# Patient Record
Sex: FEMALE | Race: WHITE | NOT HISPANIC OR LATINO | URBAN - METROPOLITAN AREA
[De-identification: names, ages, dates, MRNs, and addresses within clinical notes are randomized per-mention and may not be internally consistent; named-entity substitution may affect disease eponyms.]

---

## 2017-11-21 ENCOUNTER — EMERGENCY (EMERGENCY)
Facility: HOSPITAL | Age: 22
LOS: 0 days | Discharge: HOME | End: 2017-11-21
Attending: EMERGENCY MEDICINE | Admitting: EMERGENCY MEDICINE
Payer: SELF-PAY

## 2017-11-21 VITALS
RESPIRATION RATE: 17 BRPM | DIASTOLIC BLOOD PRESSURE: 72 MMHG | OXYGEN SATURATION: 100 % | TEMPERATURE: 98 F | HEART RATE: 72 BPM | SYSTOLIC BLOOD PRESSURE: 124 MMHG

## 2017-11-21 VITALS — HEIGHT: 64 IN | WEIGHT: 134.92 LBS

## 2017-11-21 DIAGNOSIS — N89.8 OTHER SPECIFIED NONINFLAMMATORY DISORDERS OF VAGINA: ICD-10-CM

## 2017-11-21 DIAGNOSIS — B37.3 CANDIDIASIS OF VULVA AND VAGINA: ICD-10-CM

## 2017-11-21 PROCEDURE — 99283 EMERGENCY DEPT VISIT LOW MDM: CPT

## 2017-11-21 RX ORDER — FLUCONAZOLE 150 MG/1
150 TABLET ORAL ONCE
Qty: 0 | Refills: 0 | Status: COMPLETED | OUTPATIENT
Start: 2017-11-21 | End: 2017-11-21

## 2017-11-21 RX ORDER — METRONIDAZOLE 500 MG
1 TABLET ORAL
Qty: 21 | Refills: 0
Start: 2017-11-21 | End: 2017-11-28

## 2017-11-21 RX ORDER — FLUCONAZOLE 150 MG/1
1 TABLET ORAL
Qty: 1 | Refills: 0
Start: 2017-11-21 | End: 2017-11-22

## 2017-11-21 RX ADMIN — FLUCONAZOLE 150 MILLIGRAM(S): 150 TABLET ORAL at 19:57

## 2017-11-21 NOTE — ED STATDOCS - OBJECTIVE STATEMENT
Pt is a 23 y/o F presenting to the ED with c/o vaginal discharge, onset 2 weeks. Pt describes the discharge as a clearish white. It has been persistent since onset. She states she was initially having vaginal itching but that has since resolved. Denies associated fever, abd pain, N/V, dysmenorrhea, and urinary sxs. Pt reports visiting the health [facility] her [facility] and had negative STI testing. She started on OTC monistat 4 days ago but it has not improved her sxs. Denies PMHx.

## 2017-11-21 NOTE — ED ADULT TRIAGE NOTE - CHIEF COMPLAINT QUOTE
vag discharge over week on yeast  infection medication not better  feels more itching  with discomfortable

## 2019-07-23 NOTE — ED ADULT TRIAGE NOTE - BMI (KG/M2)
Relevant Problems   No relevant active problems       Anesthetic History   No history of anesthetic complications            Review of Systems / Medical History  Patient summary reviewed, nursing notes reviewed and pertinent labs reviewed    Pulmonary  Within defined limits                 Neuro/Psych   Within defined limits           Cardiovascular    Hypertension              Exercise tolerance: >4 METS     GI/Hepatic/Renal     GERD: well controlled           Endo/Other        Morbid obesity and arthritis     Other Findings              Physical Exam    Airway  Mallampati: III  TM Distance: < 4 cm  Neck ROM: decreased range of motion   Mouth opening: Normal     Cardiovascular  Regular rate and rhythm,  S1 and S2 normal,  no murmur, click, rub, or gallop  Rhythm: regular  Rate: normal         Dental    Dentition: Caps/crowns     Pulmonary  Breath sounds clear to auscultation               Abdominal  GI exam deferred       Other Findings            Anesthetic Plan    ASA: 2  Anesthesia type: general          Induction: Intravenous  Anesthetic plan and risks discussed with: Patient and Family
23.2

## 2020-08-17 ENCOUNTER — EMERGENCY (EMERGENCY)
Facility: HOSPITAL | Age: 25
LOS: 0 days | Discharge: HOME | End: 2020-08-17
Payer: MEDICARE

## 2020-08-17 VITALS
SYSTOLIC BLOOD PRESSURE: 120 MMHG | RESPIRATION RATE: 16 BRPM | HEART RATE: 76 BPM | TEMPERATURE: 98 F | OXYGEN SATURATION: 97 % | DIASTOLIC BLOOD PRESSURE: 80 MMHG

## 2020-08-17 DIAGNOSIS — Z20.828 CONTACT WITH AND (SUSPECTED) EXPOSURE TO OTHER VIRAL COMMUNICABLE DISEASES: ICD-10-CM

## 2020-08-17 DIAGNOSIS — Z11.59 ENCOUNTER FOR SCREENING FOR OTHER VIRAL DISEASES: ICD-10-CM

## 2020-08-17 PROCEDURE — U0003: CPT

## 2020-08-17 PROCEDURE — 99283 EMERGENCY DEPT VISIT LOW MDM: CPT

## 2020-08-17 NOTE — ED STATDOCS - NS ED ROS FT
ROS: Constitutional- -fever, -chills, -body aches.  Respiratory- -cough, -SOB  Cardiac- no chest pain, no palpitations, ENT- -rhinorrhea, no sore throat, no congestion.  Abdomen- No nausea, no vomiting, no diarrhea.  Urinary- no dysuria, no urgency, no frequency.  Skin- No rashes. Neuro-  -HA

## 2020-08-17 NOTE — ED ADULT TRIAGE NOTE - CHIEF COMPLAINT QUOTE
pt presents to ED for covid swab, pt denies symptoms, pt seen and evaluated by PA and gave verbal consent to receive results via text

## 2020-08-17 NOTE — ED STATDOCS - PATIENT PORTAL LINK FT
You can access the FollowMyHealth Patient Portal offered by [facility] by registering at the following website: [email or web address]. By joining [employer]’s FollowMyHealth portal, you will also be able to view your health information using other applications (apps) compatible with our system.

## 2020-08-17 NOTE — ED STATDOCS - NSFOLLOWUPINSTRUCTIONS_ED_ALL_ED_FT
Pt here for COVID-19 testing. Pt non toxic. Pt was swabbed for COVID-19 in their car in drive through area. [facility] will call pt with results. return precautions given. Home self-quarantine recommended. Pt agrees with plan of  care.

## 2020-08-18 LAB — SARS-COV-2 RNA SPEC QL NAA+PROBE: SIGNIFICANT CHANGE UP

## 2021-10-28 ENCOUNTER — OUTPATIENT (OUTPATIENT)
Dept: OUTPATIENT SERVICES | Facility: HOSPITAL | Age: 26
LOS: 1 days | End: 2021-10-28

## 2021-10-28 VITALS
DIASTOLIC BLOOD PRESSURE: 82 MMHG | OXYGEN SATURATION: 98 % | HEIGHT: 64.5 IN | RESPIRATION RATE: 16 BRPM | TEMPERATURE: 98 F | SYSTOLIC BLOOD PRESSURE: 121 MMHG | HEART RATE: 68 BPM | WEIGHT: 136.91 LBS

## 2021-10-28 DIAGNOSIS — Z90.89 ACQUIRED ABSENCE OF OTHER ORGANS: Chronic | ICD-10-CM

## 2021-10-28 DIAGNOSIS — Z01.812 ENCOUNTER FOR PREPROCEDURAL LABORATORY EXAMINATION: ICD-10-CM

## 2021-10-28 DIAGNOSIS — M26.04 MANDIBULAR HYPOPLASIA: ICD-10-CM

## 2021-10-28 DIAGNOSIS — C71.9 MALIGNANT NEOPLASM OF BRAIN, UNSPECIFIED: Chronic | ICD-10-CM

## 2021-10-28 DIAGNOSIS — M26.02 MAXILLARY HYPOPLASIA: ICD-10-CM

## 2021-10-28 LAB
BLD GP AB SCN SERPL QL: NEGATIVE — SIGNIFICANT CHANGE UP
HCG SERPL-ACNC: <5 MIU/ML — SIGNIFICANT CHANGE UP
HCT VFR BLD CALC: 41.6 % — SIGNIFICANT CHANGE UP (ref 34.5–45)
HGB BLD-MCNC: 13.8 G/DL — SIGNIFICANT CHANGE UP (ref 11.5–15.5)
MCHC RBC-ENTMCNC: 32.7 PG — SIGNIFICANT CHANGE UP (ref 27–34)
MCHC RBC-ENTMCNC: 33.2 GM/DL — SIGNIFICANT CHANGE UP (ref 32–36)
MCV RBC AUTO: 98.6 FL — SIGNIFICANT CHANGE UP (ref 80–100)
NRBC # BLD: 0 /100 WBCS — SIGNIFICANT CHANGE UP
NRBC # FLD: 0 K/UL — SIGNIFICANT CHANGE UP
PLATELET # BLD AUTO: 228 K/UL — SIGNIFICANT CHANGE UP (ref 150–400)
RBC # BLD: 4.22 M/UL — SIGNIFICANT CHANGE UP (ref 3.8–5.2)
RBC # FLD: 10.9 % — SIGNIFICANT CHANGE UP (ref 10.3–14.5)
RH IG SCN BLD-IMP: POSITIVE — SIGNIFICANT CHANGE UP
WBC # BLD: 4.39 K/UL — SIGNIFICANT CHANGE UP (ref 3.8–10.5)
WBC # FLD AUTO: 4.39 K/UL — SIGNIFICANT CHANGE UP (ref 3.8–10.5)

## 2021-10-28 NOTE — H&P PST ADULT - HISTORY OF PRESENT ILLNESS
27Y/o female presents for preop eval for scheduled Maxillary lefort I osteotomy, b/l sagittal split osteotomies genioplasty.  Pt with c/o cross & over bite unable to correct with dental braces and TMJ.

## 2021-10-28 NOTE — H&P PST ADULT - CENTRAL VENOUS CATHETER
[Patient Intake Form Reviewed] : Patient intake form was reviewed [FreeTextEntry3] : scleral yellowing [Negative] : Endocrine [FreeTextEntry5] : shortness of breath/fatigue with exercise [FreeTextEntry1] : anemia no

## 2021-10-28 NOTE — H&P PST ADULT - PROBLEM SELECTOR PLAN 1
scheduled Maxillary lefort I osteotomy, b/l sagittal split osteotomies genioplasty on 11/9/2021.  Written & verbal preop instructions, gi prophylaxis   Pt verbalized good understanding.

## 2021-10-28 NOTE — H&P PST ADULT - NSICDXFAMILYHX_GEN_ALL_CORE_FT
FAMILY HISTORY:  Mother  Still living? Yes, Estimated age: Age Unknown  FH: heart attack, Age at diagnosis: Age Unknown  FH: type 1 diabetes, Age at diagnosis: Age Unknown    Uncle  Still living? Yes, Estimated age: Age Unknown  Family history of brain cancer, Age at diagnosis: Age Unknown

## 2021-10-28 NOTE — H&P PST ADULT - NSICDXPASTMEDICALHX_GEN_ALL_CORE_FT
PAST MEDICAL HISTORY:  Anxiety and depression h/o    Mandibular hypoplasia     Maxillary hypoplasia     No pertinent past medical history     TMJ (temporomandibular joint disorder)

## 2021-11-02 PROBLEM — M26.609 UNSPECIFIED TEMPOROMANDIBULAR JOINT DISORDER, UNSPECIFIED SIDE: Chronic | Status: ACTIVE | Noted: 2021-10-28

## 2021-11-02 PROBLEM — M26.04 MANDIBULAR HYPOPLASIA: Chronic | Status: ACTIVE | Noted: 2021-10-28

## 2021-11-02 PROBLEM — F41.9 ANXIETY DISORDER, UNSPECIFIED: Chronic | Status: ACTIVE | Noted: 2021-10-28

## 2021-11-02 PROBLEM — M26.02 MAXILLARY HYPOPLASIA: Chronic | Status: ACTIVE | Noted: 2021-10-28

## 2021-11-06 ENCOUNTER — APPOINTMENT (OUTPATIENT)
Dept: DISASTER EMERGENCY | Facility: CLINIC | Age: 26
End: 2021-11-06

## 2021-11-06 DIAGNOSIS — Z01.818 ENCOUNTER FOR OTHER PREPROCEDURAL EXAMINATION: ICD-10-CM

## 2021-11-07 LAB — SARS-COV-2 N GENE NPH QL NAA+PROBE: NOT DETECTED

## 2021-11-08 ENCOUNTER — TRANSCRIPTION ENCOUNTER (OUTPATIENT)
Age: 26
End: 2021-11-08

## 2021-11-09 ENCOUNTER — INPATIENT (INPATIENT)
Facility: HOSPITAL | Age: 26
LOS: 0 days | Discharge: HOME | End: 2021-11-10
Attending: ORAL & MAXILLOFACIAL SURGERY | Admitting: ORAL & MAXILLOFACIAL SURGERY

## 2021-11-09 VITALS
WEIGHT: 136.91 LBS | OXYGEN SATURATION: 100 % | DIASTOLIC BLOOD PRESSURE: 68 MMHG | RESPIRATION RATE: 16 BRPM | HEIGHT: 64.5 IN | HEART RATE: 76 BPM | SYSTOLIC BLOOD PRESSURE: 110 MMHG | TEMPERATURE: 98 F

## 2021-11-09 DIAGNOSIS — Z90.89 ACQUIRED ABSENCE OF OTHER ORGANS: Chronic | ICD-10-CM

## 2021-11-09 DIAGNOSIS — M26.04 MANDIBULAR HYPOPLASIA: ICD-10-CM

## 2021-11-09 LAB — HCG UR QL: NEGATIVE — SIGNIFICANT CHANGE UP

## 2021-11-09 RX ORDER — ACETAMINOPHEN 500 MG
650 TABLET ORAL EVERY 6 HOURS
Refills: 0 | Status: DISCONTINUED | OUTPATIENT
Start: 2021-11-09 | End: 2021-11-10

## 2021-11-09 RX ORDER — FLUTICASONE PROPIONATE 50 MCG
1 SPRAY, SUSPENSION NASAL
Refills: 0 | Status: DISCONTINUED | OUTPATIENT
Start: 2021-11-09 | End: 2021-11-10

## 2021-11-09 RX ORDER — IBUPROFEN 200 MG
600 TABLET ORAL EVERY 6 HOURS
Refills: 0 | Status: DISCONTINUED | OUTPATIENT
Start: 2021-11-09 | End: 2021-11-10

## 2021-11-09 RX ORDER — PENICILLIN G POTASSIUM 5000000 [IU]/1
2 POWDER, FOR SOLUTION INTRAMUSCULAR; INTRAPLEURAL; INTRATHECAL; INTRAVENOUS EVERY 4 HOURS
Refills: 0 | Status: DISCONTINUED | OUTPATIENT
Start: 2021-11-09 | End: 2021-11-10

## 2021-11-09 RX ORDER — SODIUM CHLORIDE 9 MG/ML
500 INJECTION, SOLUTION INTRAVENOUS ONCE
Refills: 0 | Status: COMPLETED | OUTPATIENT
Start: 2021-11-09 | End: 2021-11-09

## 2021-11-09 RX ORDER — OXYMETAZOLINE HYDROCHLORIDE 0.5 MG/ML
1 SPRAY NASAL
Refills: 0 | Status: DISCONTINUED | OUTPATIENT
Start: 2021-11-09 | End: 2021-11-10

## 2021-11-09 RX ORDER — CHLORHEXIDINE GLUCONATE 213 G/1000ML
15 SOLUTION TOPICAL
Refills: 0 | Status: DISCONTINUED | OUTPATIENT
Start: 2021-11-09 | End: 2021-11-10

## 2021-11-09 RX ORDER — SODIUM CHLORIDE 9 MG/ML
500 INJECTION, SOLUTION INTRAVENOUS
Refills: 0 | Status: DISCONTINUED | OUTPATIENT
Start: 2021-11-09 | End: 2021-11-10

## 2021-11-09 RX ORDER — FENTANYL CITRATE 50 UG/ML
50 INJECTION INTRAVENOUS
Refills: 0 | Status: DISCONTINUED | OUTPATIENT
Start: 2021-11-09 | End: 2021-11-09

## 2021-11-09 RX ORDER — SODIUM CHLORIDE 0.65 %
1 AEROSOL, SPRAY (ML) NASAL
Refills: 0 | Status: DISCONTINUED | OUTPATIENT
Start: 2021-11-09 | End: 2021-11-10

## 2021-11-09 RX ORDER — METOCLOPRAMIDE HCL 10 MG
10 TABLET ORAL DAILY
Refills: 0 | Status: DISCONTINUED | OUTPATIENT
Start: 2021-11-09 | End: 2021-11-10

## 2021-11-09 RX ORDER — ONDANSETRON 8 MG/1
4 TABLET, FILM COATED ORAL ONCE
Refills: 0 | Status: DISCONTINUED | OUTPATIENT
Start: 2021-11-09 | End: 2021-11-09

## 2021-11-09 RX ORDER — FENTANYL CITRATE 50 UG/ML
25 INJECTION INTRAVENOUS
Refills: 0 | Status: DISCONTINUED | OUTPATIENT
Start: 2021-11-09 | End: 2021-11-09

## 2021-11-09 RX ORDER — MORPHINE SULFATE 50 MG/1
2 CAPSULE, EXTENDED RELEASE ORAL EVERY 4 HOURS
Refills: 0 | Status: DISCONTINUED | OUTPATIENT
Start: 2021-11-09 | End: 2021-11-10

## 2021-11-09 RX ORDER — OXYCODONE HYDROCHLORIDE 5 MG/1
5 TABLET ORAL EVERY 4 HOURS
Refills: 0 | Status: DISCONTINUED | OUTPATIENT
Start: 2021-11-09 | End: 2021-11-10

## 2021-11-09 RX ORDER — ONDANSETRON 8 MG/1
4 TABLET, FILM COATED ORAL EVERY 8 HOURS
Refills: 0 | Status: DISCONTINUED | OUTPATIENT
Start: 2021-11-09 | End: 2021-11-10

## 2021-11-09 RX ADMIN — Medication 600 MILLIGRAM(S): at 19:09

## 2021-11-09 RX ADMIN — Medication 1 SPRAY(S): at 19:08

## 2021-11-09 RX ADMIN — CHLORHEXIDINE GLUCONATE 15 MILLILITER(S): 213 SOLUTION TOPICAL at 17:35

## 2021-11-09 RX ADMIN — PENICILLIN G POTASSIUM 100 MILLION UNIT(S): 5000000 POWDER, FOR SOLUTION INTRAMUSCULAR; INTRAPLEURAL; INTRATHECAL; INTRAVENOUS at 13:03

## 2021-11-09 RX ADMIN — Medication 650 MILLIGRAM(S): at 23:01

## 2021-11-09 RX ADMIN — Medication 650 MILLIGRAM(S): at 18:05

## 2021-11-09 RX ADMIN — FENTANYL CITRATE 50 MICROGRAM(S): 50 INJECTION INTRAVENOUS at 11:42

## 2021-11-09 RX ADMIN — Medication 650 MILLIGRAM(S): at 17:35

## 2021-11-09 RX ADMIN — FENTANYL CITRATE 50 MICROGRAM(S): 50 INJECTION INTRAVENOUS at 12:00

## 2021-11-09 RX ADMIN — Medication 600 MILLIGRAM(S): at 23:52

## 2021-11-09 RX ADMIN — OXYCODONE HYDROCHLORIDE 5 MILLIGRAM(S): 5 TABLET ORAL at 20:26

## 2021-11-09 RX ADMIN — SODIUM CHLORIDE 1000 MILLILITER(S): 9 INJECTION, SOLUTION INTRAVENOUS at 13:01

## 2021-11-09 RX ADMIN — Medication 600 MILLIGRAM(S): at 12:18

## 2021-11-09 RX ADMIN — SODIUM CHLORIDE 85 MILLILITER(S): 9 INJECTION, SOLUTION INTRAVENOUS at 11:47

## 2021-11-09 RX ADMIN — Medication 600 MILLIGRAM(S): at 19:39

## 2021-11-09 RX ADMIN — OXYCODONE HYDROCHLORIDE 5 MILLIGRAM(S): 5 TABLET ORAL at 19:56

## 2021-11-09 RX ADMIN — Medication 650 MILLIGRAM(S): at 23:34

## 2021-11-09 RX ADMIN — Medication 600 MILLIGRAM(S): at 12:30

## 2021-11-09 RX ADMIN — PENICILLIN G POTASSIUM 100 MILLION UNIT(S): 5000000 POWDER, FOR SOLUTION INTRAMUSCULAR; INTRAPLEURAL; INTRATHECAL; INTRAVENOUS at 19:07

## 2021-11-09 RX ADMIN — PENICILLIN G POTASSIUM 100 MILLION UNIT(S): 5000000 POWDER, FOR SOLUTION INTRAMUSCULAR; INTRAPLEURAL; INTRATHECAL; INTRAVENOUS at 21:38

## 2021-11-09 RX ADMIN — OXYMETAZOLINE HYDROCHLORIDE 1 SPRAY(S): 0.5 SPRAY NASAL at 12:46

## 2021-11-09 NOTE — H&P ADULT - NSHPREVIEWOFSYSTEMS_GEN_ALL_CORE
· Negative General Symptoms	no fever; no chills; no sweating  · Negative Skin Symptoms	no rash; no itching; no dryness  · Negative Breast Symptoms	no breast tenderness L; no breast tenderness R; no breast lump L; no breast lump R; no nipple discharge L; no nipple discharge R  · Negative Ophthalmologic Symptoms	no diplopia; no photophobia; no blurred vision L; no blurred vision R; glasses  · Negative ENMT Symptoms	no hearing difficulty; no tinnitus; no vertigo; no sinus symptoms; no throat pain; no dysphagia  · ENMT Symptoms	vertigo  chronic fullness left ear  · ENMT Comments	TMJ, over & cross bite  · Negative Respiratory and Thorax Symptoms	no wheezing; no dyspnea; no cough; no hemoptysis; no pleuritic chest pain  · Negative Cardiovascular Symptoms	no chest pain; no palpitations; no dyspnea on exertion; no orthopnea; no paroxysmal nocturnal dyspnea; no peripheral edema; no claudication  · Negative Gastrointestinal Symptoms	no nausea; no vomiting; no diarrhea; no constipation; no abdominal pain  · Negative General Genitourinary Symptoms	no hematuria; no renal colic; no flank pain L; no flank pain R; no bladder infections  · Female-Specific Symptoms	irregular menses  · Negative Musculoskeletal Symptoms	no arthritis; no joint swelling  · Negative Neurological Symptoms	no weakness; no paresthesias; no generalized seizures  · Psychiatric Symptoms	depression; anxiety; h/o no longer followed by psychiatrist, no medications  · Hematology/Lymphatics	negative  · Endocrine	negative  · Allergic/Immunologic	negative

## 2021-11-09 NOTE — H&P ADULT - NSHPPHYSICALEXAM_GEN_ALL_CORE
· Constitutional	no distress  · Eyes	PERRL; EOMI; conjunctiva clear; pupil L; pupil R  · Pupil L	round; brisk  · Pupil Size L	mm, 3  · Pupil Size R	mm, 3  · ENMT	Pharynx	no redness; no discharge  · Tonsils	no redness  · Neck	supple; no JVD  · Breasts	not examined  · Back	normal shape; ROM intact  · Respiratory	breath sounds equal; respirations non-labored; clear to auscultation bilaterally  · Cardiovascular	regular rate and rhythm  normal PMI. positive S1; positive S2  · Gastrointestinal	soft; nontender; no distention; no masses palpable; bowel sounds normal  · Genitourinary	not examined  · Rectal	not examined  · Extremities	no clubbing; no cyanosis; no pedal edema  · Vascular	  · Carotid Pulse	right normal; left normal  · Radial Pulse	right normal; left normal  · Neurological	alert and oriented x 3  · Skin	warm and dry; color normal  · Lymph Nodes	posterior cervical L; posterior cervical R; anterior cervical L; anterior cervical R; supraclavicular L; supraclavicular R  · Posterior Cervical L	normal  · Posterior Cervical R	normal  · Anterior Cervical L	normal  · Anterior Cervical R	normal  · Supraclavicular L	normal  · Supraclavicular R	normal  · Musculoskeletal	ROM intact; no joint swelling; no joint erythema; + clicking of TMJ  · Psychiatric	normal affect; normal behavior

## 2021-11-09 NOTE — H&P ADULT - HISTORY OF PRESENT ILLNESS
PATIENT REFUSES TO WEAR BIPAP     [x] Risks and benefits explained to patient   [x] Patient refuses to wear Bipap stating \"I am breathing just fine. \"  [x] Patient verbalizes understanding of information presented. 27Y/o female presents for scheduled Maxillary lefort I osteotomy, b/l sagittal split osteotomies, genioplasty.  Pt with c/o cross & over bite unable to correct with dental braces and TMJ.

## 2021-11-09 NOTE — H&P ADULT - ASSESSMENT
25Y/o female presents for scheduled Maxillary lefort I osteotomy, b/l sagittal split osteotomies, genioplasty.  Pt with c/o cross & over bite unable to correct with dental braces and TMJ.

## 2021-11-10 ENCOUNTER — TRANSCRIPTION ENCOUNTER (OUTPATIENT)
Age: 26
End: 2021-11-10

## 2021-11-10 VITALS
HEART RATE: 87 BPM | DIASTOLIC BLOOD PRESSURE: 60 MMHG | OXYGEN SATURATION: 100 % | TEMPERATURE: 97 F | SYSTOLIC BLOOD PRESSURE: 102 MMHG | RESPIRATION RATE: 18 BRPM

## 2021-11-10 RX ORDER — IBUPROFEN 200 MG
30 TABLET ORAL
Qty: 0 | Refills: 0 | DISCHARGE
Start: 2021-11-10

## 2021-11-10 RX ORDER — OXYCODONE HYDROCHLORIDE 5 MG/1
5 TABLET ORAL
Qty: 0 | Refills: 0 | DISCHARGE
Start: 2021-11-10

## 2021-11-10 RX ORDER — CHLORHEXIDINE GLUCONATE 213 G/1000ML
0 SOLUTION TOPICAL
Qty: 0 | Refills: 0 | DISCHARGE
Start: 2021-11-10

## 2021-11-10 RX ORDER — SODIUM CHLORIDE 0.65 %
0 AEROSOL, SPRAY (ML) NASAL
Qty: 0 | Refills: 0 | DISCHARGE
Start: 2021-11-10

## 2021-11-10 RX ORDER — ACETAMINOPHEN 500 MG
20.31 TABLET ORAL
Qty: 0 | Refills: 0 | DISCHARGE
Start: 2021-11-10

## 2021-11-10 RX ORDER — FLUTICASONE PROPIONATE 50 MCG
1 SPRAY, SUSPENSION NASAL
Qty: 0 | Refills: 0 | DISCHARGE
Start: 2021-11-10

## 2021-11-10 RX ORDER — AMOXICILLIN 250 MG/5ML
10 SUSPENSION, RECONSTITUTED, ORAL (ML) ORAL
Qty: 0 | Refills: 0 | DISCHARGE

## 2021-11-10 RX ORDER — OXYMETAZOLINE HYDROCHLORIDE 0.5 MG/ML
0 SPRAY NASAL
Qty: 0 | Refills: 0 | DISCHARGE
Start: 2021-11-10 | End: 2021-11-12

## 2021-11-10 RX ADMIN — Medication 650 MILLIGRAM(S): at 06:00

## 2021-11-10 RX ADMIN — OXYCODONE HYDROCHLORIDE 5 MILLIGRAM(S): 5 TABLET ORAL at 02:57

## 2021-11-10 RX ADMIN — Medication 600 MILLIGRAM(S): at 00:22

## 2021-11-10 RX ADMIN — Medication 600 MILLIGRAM(S): at 06:00

## 2021-11-10 RX ADMIN — PENICILLIN G POTASSIUM 100 MILLION UNIT(S): 5000000 POWDER, FOR SOLUTION INTRAMUSCULAR; INTRAPLEURAL; INTRATHECAL; INTRAVENOUS at 05:33

## 2021-11-10 RX ADMIN — CHLORHEXIDINE GLUCONATE 15 MILLILITER(S): 213 SOLUTION TOPICAL at 05:33

## 2021-11-10 RX ADMIN — Medication 600 MILLIGRAM(S): at 05:33

## 2021-11-10 RX ADMIN — Medication 650 MILLIGRAM(S): at 05:33

## 2021-11-10 RX ADMIN — PENICILLIN G POTASSIUM 100 MILLION UNIT(S): 5000000 POWDER, FOR SOLUTION INTRAMUSCULAR; INTRAPLEURAL; INTRATHECAL; INTRAVENOUS at 01:13

## 2021-11-10 RX ADMIN — Medication 1 SPRAY(S): at 05:33

## 2021-11-10 RX ADMIN — OXYMETAZOLINE HYDROCHLORIDE 1 SPRAY(S): 0.5 SPRAY NASAL at 05:34

## 2021-11-10 NOTE — PROGRESS NOTE ADULT - ASSESSMENT
A/P: 26y Female  s/p BSSO. POD#1     - Encourage PO intake--diet advanced to FLD   - Encourage ambulation   - Cont Abx  - Pain control  - sinus precautions   - Monitor i/o's, Vitals q4h  - d/c planning today

## 2021-11-10 NOTE — DISCHARGE NOTE PROVIDER - NSDCCPTREATMENT_GEN_ALL_CORE_FT
PRINCIPAL PROCEDURE  Procedure: Sagittal split mandibular osteotomy by intraoral approach  Findings and Treatment:

## 2021-11-10 NOTE — DISCHARGE NOTE NURSING/CASE MANAGEMENT/SOCIAL WORK - PATIENT PORTAL LINK FT
You can access the FollowMyHealth Patient Portal offered by [facility] by registering at the following website: [email or web address]. By joining [employer]’s FollowMyHealth portal, you will also be able to view your health information using other applications (apps) compatible with our system.

## 2021-11-10 NOTE — DISCHARGE NOTE PROVIDER - NSDCMRMEDTOKEN_GEN_ALL_CORE_FT
acetaminophen 160 mg/5 mL oral suspension: 20.31 milliliter(s) orally every 6 hours  amoxicillin 250 mg/5 mL oral suspension: 10 milliliter(s) orally 3 times a day  chlorhexidine 0.12% mucous membrane liquid:  mucous membrane   fluticasone 50 mcg/inh nasal spray: 1 spray(s) nasal 2 times a day  ibuprofen 100 mg/5 mL oral suspension: 30 milliliter(s) orally every 6 hours  oxyCODONE 5 mg/5 mL oral solution: 5 milliliter(s) orally every 4 hours, As needed, Moderate Pain (4 - 6)  oxymetazoline 0.05% nasal spray:  nasal   sodium chloride 0.65% nasal spray:  nasal

## 2021-11-10 NOTE — DISCHARGE NOTE PROVIDER - CARE PROVIDER_API CALL
Edwar Bacon (DDS)  OralMaxillofacial Surgery  [address]  [place], [place] [id number]  Phone: [phone]  Fax: [phone]  Follow Up Time:

## 2021-11-10 NOTE — DISCHARGE NOTE NURSING/CASE MANAGEMENT/SOCIAL WORK - NSDCPNINST_GEN_ALL_CORE
Watch for signs of infection; redness, swelling, fever, chills or heat, report such symptoms to the MD. No driving while taking pain medication, it causes drowsiness & constipation. Drink 6-8 glasses of fluids daily to promote hydration. No heavy lifting, pulling or pushing heavy objects. Follow up with the MD as per discharge orders

## 2021-11-10 NOTE — PROGRESS NOTE ADULT - SUBJECTIVE AND OBJECTIVE BOX
OMFS PROGRESS NOTE  #[id number]     26y Female s/p BSSO. pod#1     INTERVAL HX/SUBJECTIVE :   -NAEON      -Pain well controlled. No nausea or vomitting.   -Ambulating, voiding, tolerating PO.    Objective:  Gen: NAD, AAOx3  Pulm: No work on breathing  Card: RRR  EOE: B/L lower facial edema c/w surgery. Jaw bra in place, Ice in place   IOE: Sutures clean and intact. Incisions hemostatic. Gingiva pink, and well-perfused. Elastics in place. Occlusion stable into splint. Midlines coincide     Vital Signs Last 24 Hrs  T(C): 36.7 (10 Nov 2021 05:15), Max: 37 (09 Nov 2021 11:20)  T(F): 98 (10 Nov 2021 05:15), Max: 98.6 (09 Nov 2021 11:20)  HR: 72 (10 Nov 2021 05:15) (72 - 111)  BP: 112/65 (10 Nov 2021 05:15) (104/60 - 130/74)  BP(mean): 85 (09 Nov 2021 14:30) (77 - 103)  RR: 16 (10 Nov 2021 05:15) (13 - 20)  SpO2: 98% (10 Nov 2021 05:15) (92% - 100%)  I&O's Summary    09 Nov 2021 07:01  -  10 Nov 2021 05:58  --------------------------------------------------------  IN: 1860 mL / OUT: 1770 mL / NET: 90 mL      I&O's Detail    09 Nov 2021 07:01  -  10 Nov 2021 05:58  --------------------------------------------------------  IN:    IV PiggyBack: 200 mL    Lactated Ringers: 1020 mL    Oral Fluid: 640 mL  Total IN: 1860 mL    OUT:    Nasogastric/Oral tube (mL): 70 mL    Voided (mL): 1700 mL  Total OUT: 1770 mL    Total NET: 90 mL          MEDICATIONS  (STANDING):  acetaminophen    Suspension .. 650 milliGRAM(s) Oral every 6 hours  chlorhexidine 0.12% Liquid 15 milliLiter(s) Swish and Spit two times a day  fluticasone propionate 50 MICROgram(s)/spray Nasal Spray 1 Spray(s) Both Nostrils two times a day  ibuprofen  Suspension. 600 milliGRAM(s) Oral every 6 hours  oxymetazoline 0.05% Nasal Spray 1 Spray(s) Both Nostrils two times a day  penicillin   G  potassium  IVPB 2 Million Unit(s) IV Intermittent every 4 hours    MEDICATIONS  (PRN):  metoclopramide Injectable 10 milliGRAM(s) IV Push daily PRN nauseau/vomitting  morphine  - Injectable 2 milliGRAM(s) IV Push every 4 hours PRN Severe Pain (7 - 10)  ondansetron    Tablet 4 milliGRAM(s) Oral every 8 hours PRN Nausea and/or Vomiting  oxyCODONE    Solution 5 milliGRAM(s) Oral every 4 hours PRN Moderate Pain (4 - 6)  sodium chloride 0.65% Nasal 1 Spray(s) Both Nostrils every 2 hours PRN Nasal Congestion

## 2021-11-10 NOTE — DISCHARGE NOTE PROVIDER - HOSPITAL COURSE
11/9- Pt underwent BSSO. No acute events since OR. Transferred to floor, resting comfortably in bed. Pt ambulating, voiding, tolerating PO intake.   11/10: NAEON. Pt recovering uneventfully. Hemodynamically stable, optimized for d/c

## 2024-01-31 NOTE — H&P PST ADULT - SUPRACLAVICULAR R
-Check A1c. Last CMP within normal.   -A1c goal is 5.6% or less.  -Follow up with dietitian.  -Keep 3 day food log to review with dietitian.  -Start self monitoring blood glucose (SMBG) fasting and 2 hours after start of each meal.   -Glucose goals: fasting 60-90 mg/dL, 140 mg/dL or less 1 hour post meals, and 120 mg/dL or less 2 hours post meal.   -Report glucose readings weekly via [employer] every Wednesday.   -Start GDM 2000 calorie meal plan with 3 meals and 3 snacks including recommended combination of carb, protein and fat per meal/snack.  -Please eat meal or snack every 2-3.5 hours while awake.  -No more than 8 to 10 hours of fasting overnight.  -Refer to [employer] MyPlate online.   -Minimum total daily carbohydrates 175 grams paired with half grams in protein.   -Stay active if no restriction from your OB, walk up to 30 minutes a day.  -Always have glucose available to treat hypoglycemia. Use 15:15 rule. Refer to hypoglycemia patient education sheet. SMBG when experiencing signs and symptoms of hypoglycemia and prior to driving.   -Fetal growth ultrasound every 4 to 6 weeks gestation.  -20 week detailed fetal growth ultrasound.  -22-24 weeks fetal echo.  -If diabetes related medications are started; st 32 weeks gestation, NST twice a week and SAKINA weekly.   -Continue prenatal vitamin and baby aspirin as recommended.  -At 36 weeks gestation, discontinue baby aspirin.   -Continue follow-up with your OB and MFM as recommended.  -Stay in close contact with diabetes education team.  -Insulin requirements during pregnancy; basal/bolus concept and Metformin discussed.  -Very important to maintain tight glucose control during pregnancy to decrease risk factors including fetal macrosomia; birth injury; risk of ; polyhydramnios; pre-term labor; pre-eclampsia;  hypoglycemia; jaundice and stillbirth.   -Diabetes and pregnancy booklet; meal plan and hypoglycemia patient education.     normal

## 2025-03-02 ENCOUNTER — NON-APPOINTMENT (OUTPATIENT)
Age: 30
End: 2025-03-02

## 2025-06-19 NOTE — H&P PST ADULT - GENERAL
Chest pain or pressure, or a strange feeling in the chest.     Sweating.     Shortness of breath.     Pain, pressure, or a strange feeling in the back, neck, jaw, or upper belly or in one or both shoulders or arms.     Lightheadedness or sudden weakness.     A fast or irregular heartbeat.   After you call 911, the  may tell you to chew 1 adult-strength or 2 to 4 low-dose aspirin. Wait for an ambulance. Do not try to drive yourself.  Watch closely for changes in your health, and be sure to contact your doctor if you have any problems.  Where can you learn more?  Go to https://www.[employer].net/patientEd and enter F075 to learn more about \"A Healthy Heart: Care Instructions.\"  Current as of: July 31, 2024  Content Version: 14.5  © 2522-2770 [employer].   Care instructions adapted under license by [employer]. If you have questions about a medical condition or this instruction, always ask your healthcare professional. [employer], disclaims any warranty or liability for your use of this information.    Personalized Preventive Plan for Tegan Faith - 6/19/2025  Medicare offers a range of preventive health benefits. Some of the tests and screenings are paid in full while other may be subject to a deductible, co-insurance, and/or copay.  Some of these benefits include a comprehensive review of your medical history including lifestyle, illnesses that may run in your family, and various assessments and screenings as appropriate.  After reviewing your medical record and screening and assessments performed today your provider may have ordered immunizations, labs, imaging, and/or referrals for you.  A list of these orders (if applicable) as well as your Preventive Care list are included within your After Visit Summary for your review.      
details…